# Patient Record
Sex: MALE | Race: WHITE | Employment: FULL TIME | URBAN - METROPOLITAN AREA
[De-identification: names, ages, dates, MRNs, and addresses within clinical notes are randomized per-mention and may not be internally consistent; named-entity substitution may affect disease eponyms.]

---

## 2023-09-26 PROBLEM — C43.59 MALIGNANT MELANOMA OF SKIN OF ABDOMEN (MULTI): Status: ACTIVE | Noted: 2023-09-26

## 2023-09-26 RX ORDER — HYDROGEN PEROXIDE 3 %
1 SOLUTION, NON-ORAL MISCELLANEOUS
COMMUNITY
Start: 2023-08-10

## 2023-09-26 RX ORDER — ATORVASTATIN CALCIUM 20 MG/1
1 TABLET, FILM COATED ORAL DAILY
COMMUNITY
Start: 2023-09-10

## 2023-09-26 RX ORDER — ATORVASTATIN CALCIUM 10 MG/1
TABLET, FILM COATED ORAL
COMMUNITY
End: 2023-10-01

## 2023-10-01 RX ORDER — LORATADINE 10 MG/1
10 TABLET ORAL DAILY
COMMUNITY

## 2023-10-01 NOTE — H&P
History Of Present Illness  Ankit Jiménez is a 58 y.o. male who presents to our [facility] clinic today, referred by Neena Yancey for melanoma.    The patient noticed a spot on his upper abdomen that was biopsied and came back as a 1.0 mm melanoma with positive deep margin. He has no other concerns at this time. 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    All other systems have been reviewed and are negative except as noted in the HPI.    PMH is significant for GERD, hyperlipidemia.  PSH is significant for tonsils, knee replacement.    I personally reviewed all necessary laboratory results, pathology reports, and radiologic images for this patient.      Past Medical History  He has no past medical history on file.    Surgical History  He has no past surgical history on file.     Social History  He has no history on file for tobacco use, alcohol use, and drug use.    Family History  No family history on file.     Allergies  Patient has no known allergies.    Review of Systems     Physical Exam  General: no acute distress, well-nourished  Eyes: intact EOM, no scleral icterus  ENT: hearing intact, no drainage  Respiratory: symmetric chest rise, no cough  Cardiovascular: intact distal pulses, no pitting edema  Abdominal: Soft, nontender  Musculoskeletal: no deformities, intact strength  Integumentary: Healing biopsy site, no lymphadenopathy  Neuro: no focal deficits, sensation intact  Psych: normal mood and affect     Last Recorded Vitals  There were no vitals taken for this visit.    Relevant Results    Dermatopathology Final    No Documents Attached       Name: ANKIT JIMÉNEZ    Accession #: [id number]  Pathologist:                   JOHN FOSTER MD  Date of Procedure:    8/25/2023  Date Received:          8/25/2023  Date Reported           9/20/2023  Submitting Physician:   STEPHANIE CELESTIN MD  Location:                    [facility]  Copy To/Referring/Attending:  MD LILI SANCHEZ  ROSI                                                                   FINAL DIAGNOSIS  4 SLIDES/1 BLOCK, [facility], #[id number] (BX: 08/08/2023)    SKIN, EPIGASTRIC SKIN, SHAVE REMOVAL:  MALIGNANT MELANOMA, PRESENT ON THE DEEP AND PERIPHERAL MARGIN, SEE NOTE.    Note: Microscopic examination reveals a specimen that extends into the mid to  deep reticular dermis. There is an asymmetric proliferation of nested and  single atypical melanocytes along the dermal-epidermal junction which is  flattened in areas. There are areas of subepidermal clefting and there are  nests of atypical melanocytes and nests of benign appearing melanocytes in the  dermis with fibrosis. The melanocytes stain with antibodies against p16 and  SOX-10, with epidermal melanocytes and some dermal melanocytes staining with  antibodies against HMB45. The melanocytes do not stain with antibodies against  PRAME. All control slides stain appropriately.    The true depth of this melanoma is somewhat challenging to assess as there are  areas where a benign precursor nevus blend with the melanoma. It is at least  0.7 mm in areas although a depth of 1.0 mm cannot be excluded and 1.0 mm will  be used in the synoptic report.                  **Electronically Signed Out by JOHN FOSTER M.D.**                                                            CANCER SUMMARY REPORT  A.  4 SLIDES/1 BLOCK, [facility], #[id number] (BX: 08/08/2023):       SPECIMEN  Procedure:      Biopsy, shave  Specimen Laterality:      Not specified  TUMOR  Tumor Site:      Skin of trunk: Epigastric skin   Histologic Type:        Superficial spreading melanoma (low-cumulative sun  damage (CSD) melanoma)   Maximum Tumor (Breslow) Thickness (Millimeters):        At least: 1.0 mm           Focally transected on the deep margin.   Ulceration:        Not identified   Anatomic (Angel) Level:        At least level: IV           Focally transected on the deep margin.   Mitotic  "Rate:        None identified   Microsatellite(s):        Not identified   Lymphovascular Invasion:        Not identified   Neurotropism:        Not identified   Tumor-Infiltrating Lymphocytes:        Present, nonbrisk   Tumor Regression:        Not identified  MARGINS   Margin Status for Invasive Melanoma:        Invasive melanoma present at  margin     Margin(s) Involved by Invasive Melanoma:          Deep   Margin Status for Melanoma in situ:        Melanoma in situ present at margin     Margin(s) Involved by Melanoma in Situ:          Peripheral           Deep: At the periphery.  PATHOLOGIC STAGE CLASSIFICATION (pTNM, AJCC 8th Edition)  Reporting of pT categories is based on information available to the pathologist  at the time the report is issued. As per the AJCC (Chapter 1, 8th Ed.) it is  the managing physician?s responsibility to establish the final pathologic stage  based upon all pertinent information, including but potentially not limited to  this pathology report.   pT Category:        pT1b  ADDITIONAL FINDINGS  Additional Findings:      Associated nevus: Dermal nevus  ADDITIONAL TESTING  Representative Blocks:      Normal Block: Not applicable       Tumor Block: A1           Electronically Signed Out By JOHN FOSTER MD/MIGUEL  Diagnostic interpretation performed at [facility]  [address] [place] [address], [place] [id number]      Clinical History:  \"Malignant Melanoma, Morphology: irregularly pigmented plaque, Shave Removal\"    Specimens Submitted As:  A: 4 SLIDES/1 BLOCK, [facility], #[id number] (BX: 08/08/2023)    Gross Description:  Received for consultation from [facility] are four slides labeled  [id number] (BX: 08/08/2023) along with the corresponding pathology report. One  block was received 9/15/23 upon request.      Slide/Block Description  4 SLIDES/1 BLOCK, [id number].      Keep Slides:     N     Slides Returned:     N  Personal Consult:     N     Ordered by: " Joe Irving Collected/Examined: [id number] 12:00AM   Verified by: Joe Irving [id number] 10:11AM   Result Communication: Discussed results with patient;  Stage: Final   Performed at: Dermatopathology Resulted: [id number] 02:56PM Last Updated: 22Sep2023 10:11AM Accession: [id number]      Assessment/Plan   Mr Jiménez is a 58-year-old man with a newly diagnosed melanoma on his abdomen that is at least 1.0 mm thick. We discussed wide local excision with sentinel lymph node biopsy and he would like to proceed. I believe this will close primarily with 1 cm margins. He should have same-day injection. We will await review by our pathology team and tumor board to confirm this plan and then look for an OR time at [place] in the coming weeks.    Greater than 60 minutes were spent reviewing this case with the patient and other providers.    Joe Irving MD  Assistant Professor of Surgery  Division of Surgical Oncology  [email or web address]

## 2023-10-02 ENCOUNTER — HOSPITAL ENCOUNTER (OUTPATIENT)
Dept: RADIOLOGY | Facility: HOSPITAL | Age: 58
Setting detail: OUTPATIENT SURGERY
Discharge: HOME | End: 2023-10-02
Payer: COMMERCIAL

## 2023-10-02 ENCOUNTER — ANESTHESIA (OUTPATIENT)
Dept: OPERATING ROOM | Facility: HOSPITAL | Age: 58
End: 2023-10-02
Payer: COMMERCIAL

## 2023-10-02 ENCOUNTER — ANESTHESIA EVENT (OUTPATIENT)
Dept: OPERATING ROOM | Facility: HOSPITAL | Age: 58
End: 2023-10-02
Payer: COMMERCIAL

## 2023-10-02 ENCOUNTER — APPOINTMENT (OUTPATIENT)
Dept: PREADMISSION TESTING | Facility: HOSPITAL | Age: 58
End: 2023-10-02
Payer: COMMERCIAL

## 2023-10-02 ENCOUNTER — HOSPITAL ENCOUNTER (OUTPATIENT)
Dept: RADIOLOGY | Facility: HOSPITAL | Age: 58
Discharge: HOME | End: 2023-10-02
Payer: COMMERCIAL

## 2023-10-02 ENCOUNTER — APPOINTMENT (OUTPATIENT)
Dept: RADIOLOGY | Facility: HOSPITAL | Age: 58
End: 2023-10-02
Payer: COMMERCIAL

## 2023-10-02 ENCOUNTER — APPOINTMENT (OUTPATIENT)
Dept: CARDIOLOGY | Facility: HOSPITAL | Age: 58
End: 2023-10-02
Payer: COMMERCIAL

## 2023-10-02 ENCOUNTER — HOSPITAL ENCOUNTER (OUTPATIENT)
Facility: HOSPITAL | Age: 58
Setting detail: OUTPATIENT SURGERY
Discharge: HOME | End: 2023-10-02
Attending: STUDENT IN AN ORGANIZED HEALTH CARE EDUCATION/TRAINING PROGRAM | Admitting: STUDENT IN AN ORGANIZED HEALTH CARE EDUCATION/TRAINING PROGRAM
Payer: COMMERCIAL

## 2023-10-02 VITALS
WEIGHT: 217.59 LBS | RESPIRATION RATE: 18 BRPM | HEIGHT: 66 IN | HEART RATE: 79 BPM | SYSTOLIC BLOOD PRESSURE: 146 MMHG | DIASTOLIC BLOOD PRESSURE: 77 MMHG | TEMPERATURE: 97.2 F | OXYGEN SATURATION: 93 % | BODY MASS INDEX: 34.97 KG/M2

## 2023-10-02 DIAGNOSIS — C43.59 MELANOMA OF ABDOMEN (MULTI): ICD-10-CM

## 2023-10-02 DIAGNOSIS — C43.59 MALIGNANT MELANOMA OF OTHER PART OF TRUNK (MULTI): ICD-10-CM

## 2023-10-02 DIAGNOSIS — C43.59 MALIGNANT MELANOMA OF SKIN OF ABDOMEN (MULTI): Primary | ICD-10-CM

## 2023-10-02 PROCEDURE — 13101 CMPLX RPR TRUNK 2.6-7.5 CM: CPT | Performed by: STUDENT IN AN ORGANIZED HEALTH CARE EDUCATION/TRAINING PROGRAM

## 2023-10-02 PROCEDURE — 7100000009 HC PHASE TWO TIME - INITIAL BASE CHARGE: Performed by: STUDENT IN AN ORGANIZED HEALTH CARE EDUCATION/TRAINING PROGRAM

## 2023-10-02 PROCEDURE — 11606 EXC TR-EXT MAL+MARG >4 CM: CPT | Performed by: STUDENT IN AN ORGANIZED HEALTH CARE EDUCATION/TRAINING PROGRAM

## 2023-10-02 PROCEDURE — 7100000001 HC RECOVERY ROOM TIME - INITIAL BASE CHARGE: Performed by: STUDENT IN AN ORGANIZED HEALTH CARE EDUCATION/TRAINING PROGRAM

## 2023-10-02 PROCEDURE — 88342 IMHCHEM/IMCYTCHM 1ST ANTB: CPT | Performed by: DERMATOLOGY

## 2023-10-02 PROCEDURE — 38525 BIOPSY/REMOVAL LYMPH NODES: CPT | Performed by: STUDENT IN AN ORGANIZED HEALTH CARE EDUCATION/TRAINING PROGRAM

## 2023-10-02 PROCEDURE — 2500000005 HC RX 250 GENERAL PHARMACY W/O HCPCS: Performed by: STUDENT IN AN ORGANIZED HEALTH CARE EDUCATION/TRAINING PROGRAM

## 2023-10-02 PROCEDURE — 13102 CMPLX RPR TRUNK ADDL 5CM/<: CPT | Performed by: STUDENT IN AN ORGANIZED HEALTH CARE EDUCATION/TRAINING PROGRAM

## 2023-10-02 PROCEDURE — 7100000010 HC PHASE TWO TIME - EACH INCREMENTAL 1 MINUTE: Performed by: STUDENT IN AN ORGANIZED HEALTH CARE EDUCATION/TRAINING PROGRAM

## 2023-10-02 PROCEDURE — 38900 IO MAP OF SENT LYMPH NODE: CPT | Performed by: STUDENT IN AN ORGANIZED HEALTH CARE EDUCATION/TRAINING PROGRAM

## 2023-10-02 PROCEDURE — 3600000003 HC OR TIME - INITIAL BASE CHARGE - PROCEDURE LEVEL THREE: Performed by: STUDENT IN AN ORGANIZED HEALTH CARE EDUCATION/TRAINING PROGRAM

## 2023-10-02 PROCEDURE — 3600000008 HC OR TIME - EACH INCREMENTAL 1 MINUTE - PROCEDURE LEVEL THREE: Performed by: STUDENT IN AN ORGANIZED HEALTH CARE EDUCATION/TRAINING PROGRAM

## 2023-10-02 PROCEDURE — 2720000007 HC OR 272 NO HCPCS: Performed by: STUDENT IN AN ORGANIZED HEALTH CARE EDUCATION/TRAINING PROGRAM

## 2023-10-02 PROCEDURE — 7100000002 HC RECOVERY ROOM TIME - EACH INCREMENTAL 1 MINUTE: Performed by: STUDENT IN AN ORGANIZED HEALTH CARE EDUCATION/TRAINING PROGRAM

## 2023-10-02 PROCEDURE — 2500000004 HC RX 250 GENERAL PHARMACY W/ HCPCS (ALT 636 FOR OP/ED): Performed by: STUDENT IN AN ORGANIZED HEALTH CARE EDUCATION/TRAINING PROGRAM

## 2023-10-02 PROCEDURE — 88341 IMHCHEM/IMCYTCHM EA ADD ANTB: CPT | Performed by: DERMATOLOGY

## 2023-10-02 PROCEDURE — 2500000004 HC RX 250 GENERAL PHARMACY W/ HCPCS (ALT 636 FOR OP/ED): Performed by: ANESTHESIOLOGY

## 2023-10-02 PROCEDURE — 78803 RP LOCLZJ TUM SPECT 1 AREA: CPT | Performed by: STUDENT IN AN ORGANIZED HEALTH CARE EDUCATION/TRAINING PROGRAM

## 2023-10-02 PROCEDURE — 3700000001 HC GENERAL ANESTHESIA TIME - INITIAL BASE CHARGE: Performed by: STUDENT IN AN ORGANIZED HEALTH CARE EDUCATION/TRAINING PROGRAM

## 2023-10-02 PROCEDURE — 78803 RP LOCLZJ TUM SPECT 1 AREA: CPT

## 2023-10-02 PROCEDURE — 93005 ELECTROCARDIOGRAM TRACING: CPT

## 2023-10-02 PROCEDURE — 78195 LYMPH SYSTEM IMAGING: CPT | Mod: 59

## 2023-10-02 PROCEDURE — 3700000002 HC GENERAL ANESTHESIA TIME - EACH INCREMENTAL 1 MINUTE: Performed by: STUDENT IN AN ORGANIZED HEALTH CARE EDUCATION/TRAINING PROGRAM

## 2023-10-02 PROCEDURE — 88305 TISSUE EXAM BY PATHOLOGIST: CPT | Performed by: DERMATOLOGY

## 2023-10-02 RX ORDER — FENTANYL CITRATE 50 UG/ML
INJECTION, SOLUTION INTRAMUSCULAR; INTRAVENOUS AS NEEDED
Status: DISCONTINUED | OUTPATIENT
Start: 2023-10-02 | End: 2023-10-02

## 2023-10-02 RX ORDER — SODIUM CHLORIDE, SODIUM LACTATE, POTASSIUM CHLORIDE, CALCIUM CHLORIDE 600; 310; 30; 20 MG/100ML; MG/100ML; MG/100ML; MG/100ML
100 INJECTION, SOLUTION INTRAVENOUS CONTINUOUS
Status: DISCONTINUED | OUTPATIENT
Start: 2023-10-02 | End: 2023-10-02 | Stop reason: HOSPADM

## 2023-10-02 RX ORDER — MEPERIDINE HYDROCHLORIDE 25 MG/ML
12.5 INJECTION INTRAMUSCULAR; INTRAVENOUS; SUBCUTANEOUS EVERY 10 MIN PRN
Status: DISCONTINUED | OUTPATIENT
Start: 2023-10-02 | End: 2023-10-02 | Stop reason: HOSPADM

## 2023-10-02 RX ORDER — LIDOCAINE HYDROCHLORIDE AND EPINEPHRINE 10; 10 MG/ML; UG/ML
INJECTION, SOLUTION INFILTRATION; PERINEURAL AS NEEDED
Status: DISCONTINUED | OUTPATIENT
Start: 2023-10-02 | End: 2023-10-02 | Stop reason: HOSPADM

## 2023-10-02 RX ORDER — GLYCOPYRROLATE 0.2 MG/ML
INJECTION INTRAMUSCULAR; INTRAVENOUS AS NEEDED
Status: DISCONTINUED | OUTPATIENT
Start: 2023-10-02 | End: 2023-10-02

## 2023-10-02 RX ORDER — HYDRALAZINE HYDROCHLORIDE 20 MG/ML
5 INJECTION INTRAMUSCULAR; INTRAVENOUS EVERY 30 MIN PRN
Status: DISCONTINUED | OUTPATIENT
Start: 2023-10-02 | End: 2023-10-02 | Stop reason: HOSPADM

## 2023-10-02 RX ORDER — OXYCODONE HYDROCHLORIDE 5 MG/1
5 TABLET ORAL EVERY 4 HOURS PRN
Status: DISCONTINUED | OUTPATIENT
Start: 2023-10-02 | End: 2023-10-02 | Stop reason: HOSPADM

## 2023-10-02 RX ORDER — FENTANYL CITRATE 50 UG/ML
25 INJECTION, SOLUTION INTRAMUSCULAR; INTRAVENOUS EVERY 5 MIN PRN
Status: DISCONTINUED | OUTPATIENT
Start: 2023-10-02 | End: 2023-10-02 | Stop reason: HOSPADM

## 2023-10-02 RX ORDER — BUPIVACAINE HYDROCHLORIDE 5 MG/ML
INJECTION, SOLUTION EPIDURAL; INTRACAUDAL AS NEEDED
Status: DISCONTINUED | OUTPATIENT
Start: 2023-10-02 | End: 2023-10-02 | Stop reason: HOSPADM

## 2023-10-02 RX ORDER — SODIUM CHLORIDE 9 MG/ML
50 INJECTION, SOLUTION INTRAVENOUS CONTINUOUS
Status: DISCONTINUED | OUTPATIENT
Start: 2023-10-02 | End: 2023-10-02 | Stop reason: HOSPADM

## 2023-10-02 RX ORDER — TRAMADOL HYDROCHLORIDE 50 MG/1
50 TABLET ORAL 2 TIMES DAILY PRN
Qty: 5 TABLET | Refills: 0 | Status: SHIPPED | OUTPATIENT
Start: 2023-10-02 | End: 2023-10-07

## 2023-10-02 RX ORDER — ONDANSETRON HYDROCHLORIDE 2 MG/ML
4 INJECTION, SOLUTION INTRAVENOUS ONCE AS NEEDED
Status: DISCONTINUED | OUTPATIENT
Start: 2023-10-02 | End: 2023-10-02 | Stop reason: HOSPADM

## 2023-10-02 RX ORDER — CEFAZOLIN SODIUM 2 G/100ML
2 INJECTION, SOLUTION INTRAVENOUS EVERY 8 HOURS
Status: DISCONTINUED | OUTPATIENT
Start: 2023-10-02 | End: 2023-10-02 | Stop reason: HOSPADM

## 2023-10-02 RX ORDER — DIPHENHYDRAMINE HYDROCHLORIDE 50 MG/ML
12.5 INJECTION INTRAMUSCULAR; INTRAVENOUS ONCE AS NEEDED
Status: DISCONTINUED | OUTPATIENT
Start: 2023-10-02 | End: 2023-10-02 | Stop reason: HOSPADM

## 2023-10-02 RX ORDER — PROPOFOL 10 MG/ML
INJECTION, EMULSION INTRAVENOUS AS NEEDED
Status: DISCONTINUED | OUTPATIENT
Start: 2023-10-02 | End: 2023-10-02

## 2023-10-02 RX ORDER — ALBUTEROL SULFATE 0.83 MG/ML
2.5 SOLUTION RESPIRATORY (INHALATION) ONCE AS NEEDED
Status: DISCONTINUED | OUTPATIENT
Start: 2023-10-02 | End: 2023-10-02 | Stop reason: HOSPADM

## 2023-10-02 RX ORDER — MIDAZOLAM HYDROCHLORIDE 1 MG/ML
INJECTION, SOLUTION INTRAMUSCULAR; INTRAVENOUS AS NEEDED
Status: DISCONTINUED | OUTPATIENT
Start: 2023-10-02 | End: 2023-10-02

## 2023-10-02 RX ORDER — OXYCODONE HYDROCHLORIDE 5 MG/1
10 TABLET ORAL EVERY 4 HOURS PRN
Status: DISCONTINUED | OUTPATIENT
Start: 2023-10-02 | End: 2023-10-02 | Stop reason: HOSPADM

## 2023-10-02 RX ADMIN — GLYCOPYRROLATE 0.2 MG: 0.2 INJECTION, SOLUTION INTRAMUSCULAR; INTRAVENOUS at 16:40

## 2023-10-02 RX ADMIN — FENTANYL CITRATE 50 MCG: 50 INJECTION, SOLUTION INTRAMUSCULAR; INTRAVENOUS at 16:03

## 2023-10-02 RX ADMIN — MIDAZOLAM 2 MG: 1 INJECTION INTRAMUSCULAR; INTRAVENOUS at 16:01

## 2023-10-02 RX ADMIN — FENTANYL CITRATE 50 MCG: 50 INJECTION, SOLUTION INTRAMUSCULAR; INTRAVENOUS at 16:29

## 2023-10-02 RX ADMIN — GLYCOPYRROLATE 0.2 MG: 0.2 INJECTION, SOLUTION INTRAMUSCULAR; INTRAVENOUS at 16:36

## 2023-10-02 RX ADMIN — HYDROMORPHONE HYDROCHLORIDE 0.5 MG: 1 INJECTION, SOLUTION INTRAMUSCULAR; INTRAVENOUS; SUBCUTANEOUS at 18:00

## 2023-10-02 RX ADMIN — CEFAZOLIN SODIUM 2 G: 2 INJECTION, SOLUTION INTRAVENOUS at 16:10

## 2023-10-02 RX ADMIN — PROPOFOL 200 MG: 10 INJECTION, EMULSION INTRAVENOUS at 16:03

## 2023-10-02 SDOH — HEALTH STABILITY: MENTAL HEALTH: CURRENT SMOKER: 0

## 2023-10-02 ASSESSMENT — PAIN - FUNCTIONAL ASSESSMENT
PAIN_FUNCTIONAL_ASSESSMENT: 0-10

## 2023-10-02 ASSESSMENT — PAIN SCALES - GENERAL
PAINLEVEL_OUTOF10: 0 - NO PAIN
PAINLEVEL_OUTOF10: 2
PAINLEVEL_OUTOF10: 2
PAINLEVEL_OUTOF10: 0 - NO PAIN
PAINLEVEL_OUTOF10: 2
PAINLEVEL_OUTOF10: 3
PAINLEVEL_OUTOF10: 2
PAINLEVEL_OUTOF10: 5 - MODERATE PAIN

## 2023-10-02 ASSESSMENT — PAIN DESCRIPTION - DESCRIPTORS: DESCRIPTORS: BURNING

## 2023-10-02 NOTE — ANESTHESIA PREPROCEDURE EVALUATION
Patient: Ankit Jiménez    Procedure Information       Date/Time: 10/02/23 1430    Procedure: WIDE LOCAL EXCISION ABDOMEN, SENTINEL LYMPH NODE BIOPSY (SNI @ 1230) NEOPROBE    Location: [facility] 06 / [facility]    Surgeons: Joe Irving MD            Relevant Problems   Anesthesia (within normal limits)      Cardiovascular (within normal limits)      Endocrine (within normal limits)      GI (within normal limits)  GERD      /Renal (within normal limits)      Neuro/Psych (within normal limits)      Pulmonary (within normal limits)      GI/Hepatic (within normal limits)      Hematology (within normal limits)      Musculoskeletal (within normal limits)      Eyes, Ears, Nose, and Throat (within normal limits)      Infectious Disease (within normal limits)       Clinical information reviewed:   Tobacco  Allergies    Med Hx  Surg Hx   Fam Hx  Soc Hx        NPO Detail:  NPO/Void Status  Date of Last Liquid: 10/01/23  Time of Last Liquid: 1900  Date of Last Solid: 10/01/23  Time of Last Solid: 1900  Time of Last Void: 0900         Physical Exam    Airway  Mallampati: II     Cardiovascular - normal exam  Rhythm: regular     Dental - normal exam     Pulmonary - normal exam     Abdominal - normal exam             Anesthesia Plan    ASA 2     general     The patient is not a current smoker.    intravenous induction   Anesthetic plan and risks discussed with patient.    Plan discussed with CRNA.

## 2023-10-02 NOTE — ANESTHESIA PROCEDURE NOTES
Airway  Date/Time: 10/2/2023 4:07 PM  Urgency: elective    Airway not difficult    Staffing  Performed: attending   Authorized by: Bhupendra Bauman MD    Performed by: Bhupendra Bauman MD  Patient location during procedure: OR    Indications and Patient Condition  Indications for airway management: anesthesia  Spontaneous ventilation: present  Preoxygenated: yes  Patient position: sniffing  MILS maintained throughout  Mask difficulty assessment: 0 - not attempted  Planned trial extubation    Final Airway Details  Final airway type: supraglottic airway      Successful airway: classic  Size 4     Number of attempts at approach: 1  Number of other approaches attempted: 0

## 2023-10-02 NOTE — DISCHARGE INSTRUCTIONS
Melanoma Surgery Discharge Instructions    Diet  No diet restrictions    Pain control  For baseline pain control: Tylenol (acetaminophen) 1,000 mg every 8 hours for one week  For mild pain: ibuprofen 600 mg every 8 hours with food as needed  For moderate to severe pain: Tramadol as prescribed    Activity  No specific lifting or activity restrictions  In general, listen to your body and do not overly stress your surgical sites    Incisions  Your incisions are covered with skin glue. This is a sterile dressing placed in the operating room. Do not pick or peel it off. This will fall off in 2-3 weeks.  No dressing changes or wound care is needed. Do not use any ointments or cleaning agents.  You have multiple layers of sutures under the skin that will dissolve.   If you have sutures through the skin they will be removed at your postoperative appointment in 3 weeks.  You may shower the day after surgery. Let soap and water wash over the incision and pat it dry.   No going underwater (bath, pool, lake, ocean, etc.) for 2 weeks.  If you notice any of the following, please call Mariam Camp ([phone]) or Dr. Irving's office ([phone]).  Swelling under the incision like a golf ball or larger.  Redness of the skin that is spreading away from the incision.  Drainage from the incision.  Fevers, chills, or any other concerning symptoms.    Follow-up  Call Anthony Quinonez at [phone] to arrange a postop visit in 2 weeks  Dr. Irving will discuss your pathology and Tumor Board recommendations at your postop visit. You may see your pathology online prior the visit. Write down any questions you have and bring them to your postop visit.      Lymph Node Biopsy    Why is this procedure done?  You have lymph nodes all over your body. They help fight infection and are a part of your body's natural defense against illness. This is your immune system. In most cases, lymph nodes are too small to feel. If you have a problem with your  immune system, infection, or other illness, you may get a swollen lymph node. Your doctor may order this test to look for the cause of enlarged lymph nodes.    What will the results be?  The results will tell you if you have abnormal cells growing within your lymph nodes. Talk to your doctor about the results.  What happens before the procedure?  Your doctor will ask you about your health history. Talk to your doctor about:  If you are pregnant or nursing.  All the drugs you are taking. Be sure to include all prescription and over-the-counter (OTC) drugs, and herbal supplements. Tell the doctor about any drug allergy. Bring a list of drugs you take with you.  Any bleeding problems. Be sure to tell your doctor if you are taking any drugs that may cause bleeding. Some of these are warfarin, rivaroxaban, apixaban, ticagrelor, clopidogrel, ketorolac, ibuprofen, naproxen, or aspirin. Certain vitamins and herbs, such as garlic and fish oil, may also add to the risk for bleeding. You may need to stop these drugs as well. Talk to your doctor about them.  When you need to stop eating or drinking before your procedure.  Wear comfortable clothes the morning of your procedure. Do not wear any makeup.  Leave jewelry and valuables at home.  You will not be allowed to drive right away after the procedure. Ask a family member or a friend to drive you home.  What happens during the procedure?  You may or may not be asleep during the procedure. Talk to your doctor to decide what is best for you. The doctors will make sure you do not feel pain during the procedure. The biopsy site will be cleaned with special solution. The sample taken from you will be sent to the lab to be tested.  There are many ways of doing this procedure. Your doctor may:  Put a needle into your node to get a sample. This is a needle biopsy. The doctor will put pressure on the site to stop bleeding. The needle biopsy takes less than 10 minutes.  Make a cut in  your skin and take out all or part of your lymph nodes. This is an open biopsy. Then the doctor closes the cut site with stitches or skin glue and covers it with a bandage. The open biopsy takes 30 to 45 minutes.  Use a special tool with dye to take out the lymph node. This is a sentinel node biopsy. A few hours before the biopsy, doctor put a special dye near the enlarged lymph node. The dye flows into the sentinel node. The doctor cuts out the lymph nodes and the site is stitched closed and covered with a bandage. You may have blue/green urine for a few days after this test. A sentinel biopsy takes about 60 to 90 minutes.  What happens after the procedure?  You will be able to go home after you recover from the procedure.  Ask your doctor when the results will be available.  Bruising or fluid leaking from your cut site may be seen a few days after the procedure.  Your cut site will heal within 10 to 14 days.  What care is needed at home?  Ask your doctor what you need to do when you go home. Make sure you ask questions if you do not understand what the doctor says. This way you will know what you need to do.  Talk to your doctor about how to care for your biopsy site. Ask your doctor about:  When you should change your bandages  When you may take a bath or shower  If you need to be careful with lifting things over 10 pounds (4.5 kg)  When you may go back to your normal activities like work or driving  Be sure to wash your hands before touching your wound or dressing.  The biopsy site may have some bruising. Place an ice pack or a bag of frozen peas wrapped in a towel over the painful part. Never put ice right on the skin. Do not leave the ice on more than 10 to 15 minutes at a time.  What follow-up care is needed?  Your doctor may ask you to make visits to the office to check on your progress. Be sure to keep these visits. You may need more tests.  If you have stitches or staples, you will need to have them taken  out. Your doctor will often want to do this in 1 to 2 weeks.  What drugs may be needed?  The doctor may order drugs to:  Help with pain  What problems could happen?  Bleeding  Infection  Leaking fluid from the cut site  Numbness of the skin near cut site  Swelling  When do I need to call the doctor?  Signs of infection. These include a fever of 100.4°F (38°C) or higher, chills.  Signs of wound infection. These include swelling, redness, warmth around the wound; too much pain when touched; yellowish, greenish, or bloody discharge; foul smell coming from the cut site; cut site opens up.  Swelling in the limb where the procedure was performed.  Thickening of the skin at the removal site.  Bright red bleeding from the cut  Pain lasting longer than a week  Where can I learn more?  National [employer] [place]  http://www.cancer.gov/cancertopics/factsheet/detection/sentinel-node-biopsy  Last Reviewed Date  2021-02-09    General Anesthesia Discharge Instructions    About this topic  You may need general anesthesia if you need to be asleep during a procedure. Your doctor will use drugs to block the signals that go from your nerves to your brain. Doctors give general anesthesia during a surgery or procedure to:  Allow you to sleep  Help your body be still  Relax your muscles  Help you to relax and be pain free  Keep you from remembering the surgery  Let the doctor manage your airway, breathing, and blood flow  The doctor or nurse anesthetist gives general anesthesia by a shot into your vein. Sometimes, you may breathe in a gas through a mask placed over your face.  What care is needed at home?  Ask your doctor what you need to do when you go home. Make sure you ask questions if you do not understand what the doctor says.  Your doctor may give you drugs to prevent or treat an upset stomach from the anesthetic. Take them as ordered.  If your throat is sore, suck on ice chips or popsicles to ease throat pain.  Put 2 to 3 pillows  under your head and back when you lie down to help you breathe easier.  For the first 24 to 48 hours:  Do not operate heavy or dangerous machinery.  Do not make major decisions or sign important papers. You may not be able to think clearly.  Avoid beer, wine, or mixed drinks.  You are at a higher risk of falling for at least 24 hours after general anesthesia.  Take extra care when you get up.  Do not change positions quickly.  Do not rush when you need to go to the bathroom or to answer the phone.  Ask for help if you feel unsteady when you try to walk.  Wear shoes with non-slip soles and low heels.  What follow-up care is needed?  Your doctor may ask you to come back to the office to check on your progress. Be sure to keep these visits.  If you have stitches that do not dissolve or staples, you will need to have them removed. Your doctor will want to do this in 1 to 2 weeks. If the doctor used skin glue, the glue will fall off on its own.  What drugs may be needed?  The doctor may order drugs to:  Help with pain  Treat an upset stomach or throwing up  Will physical activity be limited?  You will not be allowed to drive right away after the procedure. Ask a family member or a friend to drive you home.  Avoid trying to get out of bed without help until you are sure of your balance.  You may have to limit your activity. Talk to your doctor about if you need to limit how much you lift or limit exercise after your procedure.  What changes to diet are needed?  Start with a light diet when you are fully awake. This includes things that are easy to swallow like soups, pudding, jello, toast, and eggs. Slowly progress to your normal diet.  What problems could happen?  Low blood pressure  Breathing problems  Upset stomach or throwing up  Dizziness  Blood clots  Infection  When do I need to call the doctor?  Trouble breathing  Upset stomach or throwing up more than 3 times in the next 2 days  Dizziness  Teach Back: Helping You  Understand  The Teach Back Method helps you understand the information we are giving you. After you talk with the staff, tell them in your own words what you learned. This helps to make sure the staff has described each thing clearly. It also helps to explain things that may have been confusing. Before going home, make sure you can do these:  I can tell you about my procedure.  I can tell you if I need to follow up with my doctor.  I can tell you what is good for me to eat and drink the next day.  I can tell you what I would do if I have trouble breathing, an upset stomach, or dizziness.  Where can I learn more?  [employer] [place] [employer]  https://www.nigms.nih.gov/education/pages/factsheet_Anesthesia.aspx  NHS Choices  http://www.nhs.uk/conditions/Anaesthetic-general/Pages/Definition.aspx  Last Reviewed Date

## 2023-10-02 NOTE — OP NOTE
WIDE LOCAL EXCISION ABDOMEN, SENTINEL LYMPH NODE BIOPSY (SNI @ 1230) NEOPROBE Operative Note     Date: 10/2/2023  OR Location: [facility]    Name: Ankit Jiménez : 1965, Age: 58 y.o., MRN: [id number], Sex: male    Diagnosis  Pre-op Diagnosis     * Melanoma of abdomen (CMS/HCC) [C43.59] Post-op Diagnosis     * Melanoma of abdomen (CMS/HCC) [C43.59]     Procedures    * WIDE LOCAL EXCISION ABDOMEN, SENTINEL LYMPH NODE BIOPSY (SNI @ 1230) NEOPROBE    Surgeons      * Joe Irving - Primary    Resident/Fellow/Other Assistant:  No surgical staff documented.    Procedure Summary  Anesthesia: General  ASA: II  Anesthesia Staff: Anesthesiologist: Bhupendra Bauman MD  Estimated Blood Loss: 3mL  Intra-op Medications:   Medication Name Total Dose   ceFAZolin in dextrose (iso-os) (Ancef) IVPB 2 g 2 g              Anesthesia Record               Intraprocedure I/O Totals       None           Specimen:   ID Type Source Tests Collected by Time   1 : LYMPH NODE LEFT SIDE; #273 Tissue SENTINEL LYMPH NODE MELANOMA DERMPATH-SURGICAL PATHOLOGY Joe Irving MD 10/2/2023 1634   2 : LYMPH NODE RIGHT SIDE; #179 Tissue SENTINEL LYMPH NODE MELANOMA DERMPATH-SURGICAL PATHOLOGY Joe Irving MD 10/2/2023 1650   3 : WIDE LOCAL EXCISION ABDOMEN 3X9 CM; SHORT STITCH SUPERIOR, LONG STITCH LATERAL Tissue SOFT TISSUE RESECTION DERMPATH-SURGICAL PATHOLOGY Joe Irving MD 10/2/2023 1657        Staff:   Circulator: Artemio Kern RN; Zakiya Villarreal RN  Scrub Person: Gianluca Elaine MA         INDICATIONS FOR SURGERY  Diagnosis: Primary cutaneous melanoma  Thickness: 1.0+  Location: abdomen  Referring provider: Neena Yancey  After discussing the risks and benefits of wide local excision with sentinel lymph node biopsy the patient elected to proceed.    DETAILS OF PROCEDURE  Margin for excision: 1 cm  Depth of excision: full-thickness skin/subcutaneous tissue down to fascia  Final dimensions of excision: 3 cm x 9 cm  Specimen marking  stitches: short superior, long lateral  Excision closure: 4-0 monocryl and Dermabond  Location of sentinel nodes: bilateral axilla  Number of sentinel lymph nodes: 2    The patient arrived at [facility] for the aforementioned procedure. Consent was obtained, history and physical performed, and questions were answered. The patient was moved into the operating room and induced under anesthesia. A timeout was performed prior to surgery.       For the wide local excision, the surgical site was prepped and draped in the usual sterile fashion. A margin was drawn about the lesion and this was extended into a 3:1 elliptical incision along natural body lines to facilitate a tension-free closure. Local anesthetic was injected and an incision was made along this ellipse. Electrocautery was used to dissected down to the muscular fascia and the specimen was then removed and oriented with sutures as indicated above. This was sent for permanent dermatopathology. Circumferential soft tissue flaps were created to facilitate closure. The wound was irrigated and hemostasis was achieved. The wound was then closed in a complex multilayer fashion using deep 2-0 Vicryl [id number], interrupted 3-0 Vicryl deep dermals, and a cutaneous closure indicated above. The skin was dressed with Dermabond.       For the sentinel lymph node biopsy, the lymph node basin was prepped and draped in the usual sterile fashion after verifying radiotracer presence in this basin with the neoprobe. A 3 cm incision was made over the radioactivity and dissection was carried out with electrocautery to identify the sentinel node. The node/nodes was/were removed using clips and suture as needed to control blood vessels and lymphatics and sent for permanent pathology. The wound was then irrigated and hemostasis achieved. This was closed in a multilayer fashion using a deep 2-0 Vicryl figure-of-eight, interrupted deep 3-0 Vicryl, and a running  subcuticular 4-0 Monocryl. The skin was dressed with Dermabond.       The patient was awoken and returned to the PACU in anticipation of discharge home. I was present scrubbed and directed all operative decision-making.       Joe Irving  Phone Number: [phone]

## 2023-10-02 NOTE — POST-PROCEDURE NOTE
I was the surgical first assist for  in [facility]'s surgery on 10/2/23   Procedure: WIDE LOCAL EXCISION ABDOMEN, SENTINEL LYMPH NODE BIOPSY (SNI @ [id number]) NISSA Rees PA-C

## 2023-10-03 LAB
ATRIAL RATE: 69 BPM
P AXIS: 54 DEGREES
P OFFSET: 198 MS
P ONSET: 143 MS
PR INTERVAL: 162 MS
Q ONSET: 224 MS
QRS COUNT: 11 BEATS
QRS DURATION: 82 MS
QT INTERVAL: 394 MS
QTC CALCULATION(BAZETT): 422 MS
QTC FREDERICIA: 413 MS
R AXIS: 31 DEGREES
T AXIS: 16 DEGREES
T OFFSET: 421 MS
VENTRICULAR RATE: 69 BPM

## 2023-10-03 PROCEDURE — 93010 ELECTROCARDIOGRAM REPORT: CPT | Performed by: INTERNAL MEDICINE

## 2023-10-05 NOTE — ANESTHESIA POSTPROCEDURE EVALUATION
Patient: Ankit Jiménez    Procedure Summary       Date: 10/02/23 Room / Location: [facility] 06 / [facility]    Anesthesia Start: 1603 Anesthesia Stop: 1722    Procedure: WIDE LOCAL EXCISION ABDOMEN, SENTINEL LYMPH NODE BIOPSY (SNI @ 1230) NEOPROBE Diagnosis: Melanoma of abdomen (CMS/HCC)    Surgeons: Joe Irving MD Responsible Provider: Bhupendra Bauman MD    Anesthesia Type: general ASA Status: 2            Anesthesia Type: general    Vitals Value Taken Time   /85 10/02/23 1812   Temp 36.5 °C (97.7 °F) 10/02/23 1812   Pulse 82 10/02/23 1812   Resp 14 10/02/23 1812   SpO2 96 % 10/02/23 1802       Anesthesia Post Evaluation    Patient location during evaluation: PACU  Patient participation: complete - patient participated  Level of consciousness: awake and alert  Pain management: satisfactory to patient  Multimodal analgesia pain management approach  Airway patency: patent  Cardiovascular status: acceptable and blood pressure returned to baseline  Respiratory status: acceptable  Hydration status: acceptable        No notable events documented.

## 2023-10-09 DIAGNOSIS — C43.59 MALIGNANT MELANOMA OF SKIN OF ABDOMEN (MULTI): Primary | ICD-10-CM

## 2023-10-16 NOTE — PROGRESS NOTES
ASSESSMENT AND PLAN  Ankit Jiménez is a 58 y.o. male who is now s/p wide local excision and sentinel lymph node biopsy on 10/2/23. On final pathology his margins were clear and nodes benign, T1bN0. We discussed that this is great news and does not require further workup or follow-up with us at this time. We advised dermatology follow-up for regular exams and to return to our clinic with any concerns in the future. The patient expressed understanding.       Joe Irving MD  Assistant Professor of Surgery  Division of Surgical Oncology  [phone]  [email or web address]        SUBJECTIVE  Ankit Jiménez is a 58 y.o. male who presents for a postoperative clinic visit.  Referring provider: Neena Yancey   Diagnosis: primary cutaneous melanoma  Clinical thickness: 1.0+ mm  Surgery: Wide local excision and sentinel lymph node biopsy on 10/2/23  Postoperative issues: none    Physical exam  Incisions are clean, dry, and intact    Surgical Pathology  FINAL DIAGNOSIS   A. NODE, LYMPH NODE LEFT SIDE, 273, BIOPSY:   FOCAL MELAN-A STAINING, SEE NOTE.     Note: Microscopic examination reveals a lymph node. In the capsule of the lymph node there appear to be small Melan-A positive melanocytes that are not seen on a SOX-10 or HMB45 stain. All control slides stain appropriately. The primary melanoma in [id number] was reviewed and a benign lymph node is favored. There appears a capsular nevus.     B. NODE, LYMPH NODE RIGHT SIDE, 179, BIOPSY:   BENIGN LYMPH NODE.     C. SKIN, ABDOMEN, EXCISION:   MELANOMA IN SITU AND DERMAL NEVUS, INKED MARGINS FREE IN THE PLANES OF SECTIONS EXAMINED, SEE NOTE.     Note: Microscopic examination reveals a specimen that extends into the subcutaneous fat. An area with horizontally oriented collagen and vertically oriented vessels is present. In slides C7 and C8 there is an asymmetric proliferation of nested and single atypical melanocytes along the dermal-epidermal junction.            Patient

## 2023-10-17 ENCOUNTER — OFFICE VISIT (OUTPATIENT)
Dept: SURGICAL ONCOLOGY | Facility: CLINIC | Age: 58
End: 2023-10-17
Payer: COMMERCIAL

## 2023-10-17 VITALS
DIASTOLIC BLOOD PRESSURE: 88 MMHG | TEMPERATURE: 97.3 F | SYSTOLIC BLOOD PRESSURE: 154 MMHG | RESPIRATION RATE: 16 BRPM | OXYGEN SATURATION: 95 % | BODY MASS INDEX: 34.91 KG/M2 | WEIGHT: 216.27 LBS | HEART RATE: 69 BPM

## 2023-10-17 DIAGNOSIS — C43.59 MALIGNANT MELANOMA OF SKIN OF ABDOMEN (MULTI): Primary | ICD-10-CM

## 2023-10-17 LAB
LAB AP ASR DISCLAIMER: NORMAL
LABORATORY COMMENT REPORT: NORMAL
PATH REPORT.FINAL DX SPEC: NORMAL
PATH REPORT.GROSS SPEC: NORMAL
PATH REPORT.MICROSCOPIC SPEC OTHER STN: NORMAL
PATH REPORT.RELEVANT HX SPEC: NORMAL
PATH REPORT.TOTAL CANCER: NORMAL
PATHOLOGY SYNOPTIC REPORT: NORMAL

## 2023-10-17 PROCEDURE — 1036F TOBACCO NON-USER: CPT | Performed by: STUDENT IN AN ORGANIZED HEALTH CARE EDUCATION/TRAINING PROGRAM

## 2023-10-17 PROCEDURE — 99024 POSTOP FOLLOW-UP VISIT: CPT | Performed by: STUDENT IN AN ORGANIZED HEALTH CARE EDUCATION/TRAINING PROGRAM

## 2023-10-17 ASSESSMENT — PATIENT HEALTH QUESTIONNAIRE - PHQ9
1. LITTLE INTEREST OR PLEASURE IN DOING THINGS: NOT AT ALL
SUM OF ALL RESPONSES TO PHQ9 QUESTIONS 1 AND 2: 0
2. FEELING DOWN, DEPRESSED OR HOPELESS: NOT AT ALL

## 2023-10-17 ASSESSMENT — PAIN SCALES - GENERAL: PAINLEVEL: 0-NO PAIN

## 2023-10-17 ASSESSMENT — ENCOUNTER SYMPTOMS
LOSS OF SENSATION IN FEET: 0
DEPRESSION: 0
OCCASIONAL FEELINGS OF UNSTEADINESS: 0

## 2023-10-17 NOTE — LETTER
October 17, 2023     Neena Yancey PA-C  [address] [facility] [address]    Patient: Ankit Jiménez   YOB: 1965   Date of Visit: 10/17/2023       Dear Dr. Neena Yancey PA-C:    Thank you for referring Ankit Jiménez to me for evaluation. Below are my notes for this consultation.  If you have questions, please do not hesitate to call me. I look forward to following your patient along with you.       Sincerely,     Joe Irving MD      CC: No Recipients  ______________________________________________________________________________________    ASSESSMENT AND PLAN  Ankit Jiménez is a 58 y.o. male who is now s/p wide local excision and sentinel lymph node biopsy on 10/2/23. On final pathology his margins were clear and nodes benign, T1bN0. We discussed that this is great news and does not require further workup or follow-up with us at this time. We advised dermatology follow-up for regular exams and to return to our clinic with any concerns in the future. The patient expressed understanding.       Joe Irving MD  Assistant Professor of Surgery  Division of Surgical Oncology  [phone]  [email or web address]        SUBJECTIVE  Ankit Jiménez is a 58 y.o. male who presents for a postoperative clinic visit.  Referring provider: Neena Yancey   Diagnosis: primary cutaneous melanoma  Clinical thickness: 1.0+ mm  Surgery: Wide local excision and sentinel lymph node biopsy on 10/2/23  Postoperative issues: none    Physical exam  Incisions are clean, dry, and intact    Surgical Pathology  FINAL DIAGNOSIS   A. NODE, LYMPH NODE LEFT SIDE, 273, BIOPSY:   FOCAL MELAN-A STAINING, SEE NOTE.     Note: Microscopic examination reveals a lymph node. In the capsule of the lymph node there appear to be small Melan-A positive melanocytes that are not seen on a SOX-10 or HMB45 stain. All control slides stain appropriately. The primary melanoma in [id number] was reviewed and a benign lymph  node is favored. There appears a capsular nevus.     B. NODE, LYMPH NODE RIGHT SIDE, 179, BIOPSY:   BENIGN LYMPH NODE.     C. SKIN, ABDOMEN, EXCISION:   MELANOMA IN SITU AND DERMAL NEVUS, INKED MARGINS FREE IN THE PLANES OF SECTIONS EXAMINED, SEE NOTE.     Note: Microscopic examination reveals a specimen that extends into the subcutaneous fat. An area with horizontally oriented collagen and vertically oriented vessels is present. In slides C7 and C8 there is an asymmetric proliferation of nested and single atypical melanocytes along the dermal-epidermal junction.

## 2023-10-19 DIAGNOSIS — C43.9 MELANOMA OF SKIN (MULTI): Primary | ICD-10-CM

## 2023-10-19 NOTE — TUMOR BOARD NOTE
General Patient Information  Name:  Ankit Jiménez  Evaluation #:  2  Conference Date:  10-  YOB: 1965  MRN:  [id number]  Program Physician(s):  Rito Carver  Referring Physician(s):  Joe Box      Summary   Stage:  Margareth ([id number]) ; Melanoma 5 year survival: 99%    Assessment:  Epigastric skin with a non-ulcerated superficial spreading melanoma, Breslow: at least 1.0 mm, focally transected on the deep margin.  S/p WLE with 1 cm margins and benign SLNB (0/2).  Adequately surgically treated.    Recommendation:  Annual H&P.    Review Multidisciplinary Cutaneous Oncology Conference recommendation with patient.  Continue routine follow up and total body skin exams with Neena Yancey.    Follow Up:  Joe Box      History and Physical Exam  Dermatologic History:   58 y.o. male with a biopsy of the epigastric skin on 8-8-2023 showing a non-ulcerated melanoma, superficial spreading type, Breslow: at least 1.0 mm, focally transected on the deep margin.  The true depth of this melanoma is somewhat challenging to assess as there are areas where a benign precursor nevus blend with the melanoma.  It is at least 0.7 mm in areas although a depth of 1.0 mm cannot be excluded.  S/p on 10-2-2023 WLE with 1 cm margins showing melanoma in situ and dermal nevus, inked margins free in the planes of sections examined, left side SLNB showing focal Melan-A staining and a capsular nevus, benign node favored (0/1), and benign right side SLNB (0/1).    Past Medical History:  GERD, Hyperlipidemia    Family History of DNS/MM:  Unknown    Skin:  Healing biopsy site    Lymphatic:  No lymphadenopathy      Pathology  DERMATOPATHOLOGY -DERMPATH LAB: [id number]  A. NODE, LYMPH NODE LEFT SIDE, 273, BIOPSY:   FOCAL MELAN-A STAINING, SEE NOTE.     Note: Microscopic examination reveals a lymph node. In the capsule of the lymph node there appear to be small Melan-A positive  melanocytes that are not seen on a SOX-10 or HMB45 stain. All control slides stain appropriately. The primary melanoma in [id number] was reviewed and a benign lymph node is favored. There appears a capsular nevus.     B. NODE, LYMPH NODE RIGHT SIDE, 179, BIOPSY:   BENIGN LYMPH NODE.     C. SKIN, ABDOMEN, EXCISION:   MELANOMA IN SITU AND DERMAL NEVUS, INKED MARGINS FREE IN THE PLANES OF SECTIONS EXAMINED, SEE NOTE.     Note: Microscopic examination reveals a specimen that extends into the subcutaneous fat. An area with horizontally oriented collagen and vertically oriented vessels is present. In slides C7 and C8 there is an asymmetric proliferation of nested and single atypical melanocytes along the dermal-epidermal junction.     ** Electronically signed out by Italo Salas MD **     B. Microscopic examination reveals a lymph node with normal architecture.  No melanoma is seen on H and E staining.  Melan-A, SOX-10, and HMB45 stains are unremarkable. All control slides stain appropriately. There is an area of nests of benign appearing melanocytes in the dermis. A step section was performed.     Case Summary Report   MELANOMA OF THE SKIN: Excision, Re-Excision   8th Edition - Protocol posted: 11/10/2021MELANOMA OF THE SKIN: EXCISION, RE-EXCISION  - C  SPECIMEN   Procedure  Re-excision     Nelson node(s) biopsy   Specimen Laterality  Not specified   TUMOR   Tumor Site  Skin of trunk: Abdomen        Histologic Type  Superficial spreading melanoma (low-cumulative sun damage (CSD) melanoma)   Maximum Tumor (Breslow) Thickness (Millimeters)  1.0 mm   Macroscopic Satellite Nodule(s)  Not identified   Ulceration  Not identified   Anatomic (Angel) Level  IV (Melanoma invades reticular dermis)   Mitotic Rate  None identified   Microsatellite(s)  Not identified   Lymphovascular Invasion  Not identified   Neurotropism  Not identified   Tumor-Infiltrating Lymphocytes  Present, nonbrisk   Tumor Regression  Not identified   MARGINS      Margin Status for Invasive Melanoma  Cannot be determined: No residual invasive melanoma.   Margin Status for Melanoma in situ  All margins negative for melanoma in situ   Distance from Melanoma in Situ to Peripheral Margin  6.5 mm   Distance from Melanoma in Situ to Deep Margin  Greater than: 5 mm   REGIONAL LYMPH NODES     Regional Lymph Node Status  All regional lymph nodes negative for tumor   Total Number of Lymph Nodes Examined  2   Number of [place] Nodes Examined  2   PATHOLOGIC STAGE CLASSIFICATION (pTNM, AJCC 8th Edition)     pT Category  pT1b   pN Category  pN0   ADDITIONAL FINDINGS   Additional Findings  Associated nevus: Dermal nevus        ___________________________________________________________________________________________________    Report Status: Final  Name: GISSEL LUNA  Accession #: [id number]  Pathologist: JOHN FOSTER MD  Date of Procedure: 8/25/2023  Date Received: 8/25/2023  Date Reported 9/20/2023  Submitting Physician: STEPHANIE CELESTIN MD  Location: [facility]  Copy To/Referring/Attending:  MD LILI SANCHEZ      FINAL DIAGNOSIS  4 SLIDES/1 BLOCK, [facility], #[id number] (BX: 08/08/2023)  SKIN, EPIGASTRIC SKIN, SHAVE REMOVAL:    MALIGNANT MELANOMA, PRESENT ON THE DEEP AND PERIPHERAL MARGIN, SEE NOTE.    Note: Microscopic examination reveals a specimen that extends into the mid to  deep reticular dermis. There is an asymmetric proliferation of nested and  single atypical melanocytes along the dermal-epidermal junction which is  flattened in areas. There are areas of subepidermal clefting and there are  nests of atypical melanocytes and nests of benign appearing melanocytes in the  dermis with fibrosis. The melanocytes stain with antibodies against p16 and  SOX-10, with epidermal melanocytes and some dermal melanocytes staining with  antibodies against HMB45. The melanocytes do not stain with antibodies against  PRAME. All control slides stain  appropriately.    The true depth of this melanoma is somewhat challenging to assess as there are  areas where a benign precursor nevus blend with the melanoma. It is at least  0.7 mm in areas although a depth of 1.0 mm cannot be excluded and 1.0 mm will  be used in the synoptic report.    **Electronically Signed Out by JOHN FOSTER M.D.**      CANCER SUMMARY REPORT  A. 4 SLIDES/1 BLOCK, [facility], #[id number] (BX: 08/08/2023):    SPECIMEN  Procedure: Biopsy, shave  Specimen Laterality: Not specified    TUMOR  Tumor Site: Skin of trunk: Epigastric skin  Histologic Type: Superficial spreading melanoma (low-cumulative sun  damage (CSD) melanoma)  Maximum Tumor (Breslow) Thickness (Millimeters): At least: 1.0 mm  Focally transected on the deep margin.  Ulceration: Not identified  Anatomic (Angel) Level: At least level: IV  Focally transected on the deep margin.  Mitotic Rate: None identified  Microsatellite(s): Not identified  Lymphovascular Invasion: Not identified  Neurotropism: Not identified  Tumor-Infiltrating Lymphocytes: Present, nonbrisk  Tumor Regression: Not identified    MARGINS  Margin Status for Invasive Melanoma: Invasive melanoma present at  margin  Margin(s) Involved by Invasive Melanoma: Deep  Margin Status for Melanoma in situ: Melanoma in situ present at margin  Margin(s) Involved by Melanoma in Situ: Peripheral  Deep: At the periphery.    PATHOLOGIC STAGE CLASSIFICATION (pTNM, AJCC 8th Edition)  pT Category: pT1b    ADDITIONAL FINDINGS  Additional Findings: Associated nevus: Dermal nevus    ADDITIONAL TESTING  Representative Blocks: Normal Block: Not applicable  Tumor Block: A1    Electronically Signed Out By JOHN FOSTER MD/[facility]

## 2023-10-23 ENCOUNTER — TUMOR BOARD CONFERENCE (OUTPATIENT)
Dept: HEMATOLOGY/ONCOLOGY | Facility: HOSPITAL | Age: 58
End: 2023-10-23
Payer: COMMERCIAL

## 2025-02-11 ENCOUNTER — OFFICE (OUTPATIENT)
Dept: URBAN - METROPOLITAN AREA CLINIC 15 | Facility: CLINIC | Age: 60
End: 2025-02-11

## 2025-02-11 VITALS
DIASTOLIC BLOOD PRESSURE: 82 MMHG | TEMPERATURE: 98 F | OXYGEN SATURATION: 96 % | HEART RATE: 75 BPM | SYSTOLIC BLOOD PRESSURE: 136 MMHG | WEIGHT: 223 LBS | HEIGHT: 67 IN

## 2025-02-11 DIAGNOSIS — K21.9 GASTRO-ESOPHAGEAL REFLUX DISEASE WITHOUT ESOPHAGITIS: ICD-10-CM

## 2025-02-11 DIAGNOSIS — Z86.0100 PERSONAL HISTORY OF COLON POLYPS, UNSPECIFIED: ICD-10-CM

## 2025-02-11 DIAGNOSIS — Z80.0 FAMILY HISTORY OF MALIGNANT NEOPLASM OF DIGESTIVE ORGANS: ICD-10-CM

## 2025-02-11 PROCEDURE — 99213 OFFICE O/P EST LOW 20 MIN: CPT | Performed by: NURSE PRACTITIONER

## (undated) DEVICE — SUTURE, VICRYL, 2-0, 27 IN, X-1, UNDYED

## (undated) DEVICE — Device

## (undated) DEVICE — CUP, MEDICINE, GRADUATED, 2 OZ, PLASTIC, DISP, LF

## (undated) DEVICE — GLOVE, SURGICAL, PROTEXIS PI , 7.5, PF, LF

## (undated) DEVICE — MARKER, SKIN, W/ RULER, LF, STERILE

## (undated) DEVICE — SOLUTION, IRRIGATION, SODIUM CHLORIDE 0.9%, 1000 ML, POUR BOTTLE

## (undated) DEVICE — STRAP, ARM BOARD, 32 X 1.5

## (undated) DEVICE — GOWN, SURGICAL, ROYAL SILK, XL, STERILE

## (undated) DEVICE — DRAPE, INCISE, ANTIMICROBIAL, IOBAN 2, LARGE, 17 X 23 IN, DISPOSABLE, STERILE

## (undated) DEVICE — SUTURE, VICRYL, 3-0, 27 IN, SH

## (undated) DEVICE — TIP, SUCTION, YANKAUER, FLEXIBLE

## (undated) DEVICE — STRAP, KNEE AND BODY, SINGLE USE

## (undated) DEVICE — SYRINGE, 10 CC, LUER LOCK

## (undated) DEVICE — COVER, PROBE, TRANSDUCER, PROTECTOR, STERILE

## (undated) DEVICE — SUTURE, MONOCRYL, 4-0, 27 IN, PS-2, UNDYED

## (undated) DEVICE — RESERVOIR, DRAINAGE, WOUND, JACKSON-PRATT, 100 CC, SILICONE

## (undated) DEVICE — STRAP, VELCRO, BODY, 4 X 60IN, NS

## (undated) DEVICE — TOWEL PACK 10-PK

## (undated) DEVICE — COVER, TRANSDUCER, NEO GUARD, 4 X 30CM, LF

## (undated) DEVICE — APPLIER,  LIGACLIP MULTI CLIP, 30 MED 11 1/2

## (undated) DEVICE — GOWN, SURGICAL, ROYAL SILK, LG, STERILE

## (undated) DEVICE — APPLICATOR, CHLORAPREP, W/ORANGE TINT, 26ML

## (undated) DEVICE — NEEDLE, SAFETY, 25 GA X 1.5 IN

## (undated) DEVICE — GLOVE, SURGICAL, PROTEXIS PI W/NEU-THERA, 8.0, PF, LF

## (undated) DEVICE — FORCEP, BONNEY TISSUE, 1 X 2 TEETH

## (undated) DEVICE — SUTURE, ETHILON, 2-0, FSLX 30, BLACK